# Patient Record
Sex: FEMALE | Race: WHITE | Employment: OTHER | URBAN - METROPOLITAN AREA
[De-identification: names, ages, dates, MRNs, and addresses within clinical notes are randomized per-mention and may not be internally consistent; named-entity substitution may affect disease eponyms.]

---

## 2023-07-27 ENCOUNTER — OFFICE VISIT (OUTPATIENT)
Dept: RHEUMATOLOGY | Facility: CLINIC | Age: 68
End: 2023-07-27
Payer: MEDICARE

## 2023-07-27 ENCOUNTER — TELEPHONE (OUTPATIENT)
Dept: RHEUMATOLOGY | Facility: CLINIC | Age: 68
End: 2023-07-27

## 2023-07-27 VITALS
OXYGEN SATURATION: 93 % | RESPIRATION RATE: 16 BRPM | HEIGHT: 59 IN | WEIGHT: 97 LBS | BODY MASS INDEX: 19.56 KG/M2 | TEMPERATURE: 99 F | DIASTOLIC BLOOD PRESSURE: 78 MMHG | HEART RATE: 72 BPM | SYSTOLIC BLOOD PRESSURE: 150 MMHG

## 2023-07-27 DIAGNOSIS — M81.8 OTHER OSTEOPOROSIS WITHOUT CURRENT PATHOLOGICAL FRACTURE: ICD-10-CM

## 2023-07-27 DIAGNOSIS — M81.0 AGE-RELATED OSTEOPOROSIS WITHOUT CURRENT PATHOLOGICAL FRACTURE: ICD-10-CM

## 2023-07-27 DIAGNOSIS — M19.90 OSTEOARTHRITIS, UNSPECIFIED OSTEOARTHRITIS TYPE, UNSPECIFIED SITE: ICD-10-CM

## 2023-07-27 DIAGNOSIS — M35.1 MIXED CONNECTIVE TISSUE DISEASE (HCC): Primary | ICD-10-CM

## 2023-07-27 DIAGNOSIS — M06.09 RHEUMATOID ARTHRITIS OF MULTIPLE SITES WITH NEGATIVE RHEUMATOID FACTOR (HCC): ICD-10-CM

## 2023-07-27 DIAGNOSIS — M35.01 SJOGREN'S SYNDROME WITH KERATOCONJUNCTIVITIS SICCA (HCC): ICD-10-CM

## 2023-07-27 DIAGNOSIS — M06.09 RHEUMATOID ARTHRITIS OF MULTIPLE SITES WITHOUT RHEUMATOID FACTOR (HCC): ICD-10-CM

## 2023-07-27 PROBLEM — M35.00 SICCA SYNDROME, SJOGREN'S (HCC): Status: ACTIVE | Noted: 2023-07-27

## 2023-07-27 PROCEDURE — 99215 OFFICE O/P EST HI 40 MIN: CPT | Performed by: INTERNAL MEDICINE

## 2023-07-27 RX ORDER — HYDROXYCHLOROQUINE SULFATE 200 MG/1
200 TABLET, FILM COATED ORAL DAILY
COMMUNITY
Start: 2023-03-01

## 2023-07-27 RX ORDER — AMLODIPINE BESYLATE 5 MG/1
5 TABLET ORAL
COMMUNITY
Start: 2023-03-28

## 2023-07-27 RX ORDER — LEVOTHYROXINE SODIUM 0.07 MG/1
75 TABLET ORAL
COMMUNITY
Start: 2023-07-20

## 2023-07-27 NOTE — TELEPHONE ENCOUNTER
Patient would like to proceed with prolia injections.     They plan on getting labs at [facility] and would like order sent there

## 2023-08-09 ENCOUNTER — TELEPHONE (OUTPATIENT)
Dept: RHEUMATOLOGY | Facility: CLINIC | Age: 68
End: 2023-08-09

## 2023-08-09 NOTE — TELEPHONE ENCOUNTER
Phoned pt, explained that we have not received her blood work. Pt will reach out to [employer] and fax to our office. Number provided.

## 2023-08-21 ENCOUNTER — TELEPHONE (OUTPATIENT)
Dept: RHEUMATOLOGY | Facility: CLINIC | Age: 68
End: 2023-08-21

## 2023-08-21 NOTE — TELEPHONE ENCOUNTER
Pt called office, making sure Dr. Hugo Pires received her test results from [address] [place] [address].

## 2023-09-09 ENCOUNTER — PATIENT MESSAGE (OUTPATIENT)
Dept: RHEUMATOLOGY | Facility: CLINIC | Age: 68
End: 2023-09-09

## 2023-09-12 ENCOUNTER — TELEPHONE (OUTPATIENT)
Dept: RHEUMATOLOGY | Facility: CLINIC | Age: 68
End: 2023-09-12

## 2023-09-25 RX ORDER — HYDROXYCHLOROQUINE SULFATE 200 MG/1
200 TABLET, FILM COATED ORAL DAILY
Qty: 30 TABLET | Refills: 2 | Status: SHIPPED | OUTPATIENT
Start: 2023-09-25

## 2023-09-25 NOTE — TELEPHONE ENCOUNTER
Future Appointments   Date Time Provider [place] Ivanna   1/29/2024 11:20 AM Balta Kahn MD [facility] [address]     LOV 7/27/23

## 2023-09-25 NOTE — TELEPHONE ENCOUNTER
From: Faustino Leon  To: Brad Shaver MD  Sent: 9/9/2023 7:46 PM CDT  Subject: Prolia shot    Hi Dr. Soledad Toledo,    Please let me know if my labs are alright so we can schedule the Prolia shot.     Thank you,  Lamont Dunbar

## 2023-12-27 RX ORDER — HYDROXYCHLOROQUINE SULFATE 200 MG/1
200 TABLET, FILM COATED ORAL DAILY
Qty: 30 TABLET | Refills: 2 | Status: SHIPPED | OUTPATIENT
Start: 2023-12-27

## 2023-12-27 NOTE — TELEPHONE ENCOUNTER
LOV:   07/27/23      Future Appointments   Date Time Provider [place] Goncalves   1/29/2024 11:20 AM Merissa Kahn MD [facility] [address]     LF:  09/25/2023   QTY:    30     Refills:   2    EYE EXAM:  10/20/2023

## 2024-01-29 ENCOUNTER — OFFICE VISIT (OUTPATIENT)
Dept: RHEUMATOLOGY | Facility: CLINIC | Age: 69
End: 2024-01-29
Payer: MEDICARE

## 2024-01-29 VITALS
HEIGHT: 59 IN | SYSTOLIC BLOOD PRESSURE: 140 MMHG | RESPIRATION RATE: 16 BRPM | BODY MASS INDEX: 20.36 KG/M2 | TEMPERATURE: 99 F | HEART RATE: 71 BPM | DIASTOLIC BLOOD PRESSURE: 80 MMHG | OXYGEN SATURATION: 94 % | WEIGHT: 101 LBS

## 2024-01-29 DIAGNOSIS — M81.0 AGE-RELATED OSTEOPOROSIS WITHOUT CURRENT PATHOLOGICAL FRACTURE: Primary | ICD-10-CM

## 2024-01-29 DIAGNOSIS — M35.1 MIXED CONNECTIVE TISSUE DISEASE (HCC): ICD-10-CM

## 2024-01-29 DIAGNOSIS — M62.9 DISORDER OF MUSCLE, UNSPECIFIED: ICD-10-CM

## 2024-01-29 DIAGNOSIS — M70.61 TROCHANTERIC BURSITIS OF RIGHT HIP: ICD-10-CM

## 2024-01-29 DIAGNOSIS — M06.09 RHEUMATOID ARTHRITIS OF MULTIPLE SITES WITH NEGATIVE RHEUMATOID FACTOR (HCC): ICD-10-CM

## 2024-01-29 DIAGNOSIS — M04.8 OTHER AUTOINFLAMMATORY SYNDROMES (HCC): ICD-10-CM

## 2024-01-29 DIAGNOSIS — M35.01 SJOGREN'S SYNDROME WITH KERATOCONJUNCTIVITIS SICCA (HCC): ICD-10-CM

## 2024-01-29 PROCEDURE — 99214 OFFICE O/P EST MOD 30 MIN: CPT | Performed by: INTERNAL MEDICINE

## 2024-01-29 NOTE — PATIENT INSTRUCTIONS
OSTEOARTHRITIS    Fast Facts    Though some of the joint changes are irreversible, most patients will not need joint replacement surgery.    OA symptoms (what you feel) can vary greatly among patients.    A rheumatologist can detect arthritis and prescribe the proper treatment. The goal of treatment in OA is to reduce pain and improve function.    Exercise is an important part of OA treatment, because it can decrease joint pain and improve function.    At present, there is no treatment that can reverse the damage of OA in the joints. Researchers are trying to find ways to slow or reverse this joint damage.    Osteoarthritis (also known as OA) is a common joint disease that most often affects middle-age to elderly people. It is commonly referred to as \"wear and tear\" of the joints, but we now know that OA is a disease of the entire joint, involving the cartilage, joint lining, ligaments, and bone. Although it is more common in older people, it is not really accurate to say that the joints are just \"wearing out.\" It is characterized by breakdown of the cartilage (the tissue that cushions the ends of the bones between joints), bony changes of the joints, deterioration of tendons and ligaments, and various degrees of inflammation of the joint lining (called the synovium).    This arthritis tends to occur in the hand joints, spine, hips, knees, and great toes. The lifetime risk of developing OA of the knee is about 46%, and the lifetime risk of developing OA of the hip is 25%, according to the [facility] Osteoarthritis Project, a long-term study from the [facility] and sponsored by the Centers for Disease Control and Prevention (often called the CDC) and the National Institutes of Health.    OA is a top cause of disability in older people. The goal of osteoarthritis treatment is to reduce pain and improve function. There is no cure for the disease, but some treatments attempt to slow disease  progression.         What is osteoarthritis?    OA is a frequently slowly progressive joint disease typically seen in middle-aged to elderly people. In osteoarthritis, the cartilage between the bones in the joint breaks down. This causes the affected bones to slowly get bigger. The joint cartilage often breaks down because of mechanical stress or biochemical changes within the body, causing the bone underneath to fail. OA can occur together with other types of arthritis, such as gout or rheumatoid arthritis.    OA tends to affect commonly used joints such as the hands and spine, and the weight-bearing joints such as the hips and knees. Symptoms include:    Joint pain and stiffness    Knobby swelling at the joint    Cracking or grinding noise with joint movement    Decreased function of the joint    How do you treat osteoarthritis?    There is no proven treatment yet that can reverse joint damage from OA. The goal of osteoarthritis treatment is to reduce pain and improve function of the affected joints. Most often, this is possible with a mixture of physical measures and drug therapy and, sometimes, surgery.    Physical measures: Weight loss and exercise are useful in OA. Excess weight puts stress on your knee joints and hips and low back. For every 10 pounds of weight you lose over 10 years, you can reduce the chance of developing knee OA by up to 50 percent. Exercise can improve your muscle strength, decrease joint pain and stiffness, and lower the chance of disability due to OA. Also helpful are support (\"assistive\") devices, such as orthotics or a walking cane, that help you do daily activities. Heat or cold therapy can help relieve OA symptoms for a short time.    Certain alternative treatments such as spa (hot tub), massage, and chiropractic manipulation can help relieve pain for a short time. They can be costly, though, and require repeated treatments. Also, the long-term benefits of these alternative  (sometimes called complementary or integrative) medicine treatments are unproven but are under study.    Drug therapy: Forms of drug therapy include topical, oral (by mouth) and injections (shots). You apply topical drugs directly on the skin over the affected joints. These medicines include capsaicin cream, lidocaine and diclofenac gel. Oral pain relievers such as acetaminophen are common first treatments. So are nonsteroidal anti-inflammatory drugs (often called NSAIDs), which decrease swelling and pain.    In 2010, the government (FDA) approved the use of duloxetine (Cymbalta) for chronic (long-term) musculoskeletal pain including from OA. This oral drug is not new. It also is in use for other health concerns, such as mood disorders, nerve pain and fibromyalgia.    Patients with more serious pain may need stronger medications, such as prescription narcotics.    Joint injections with corticosteroids (sometimes called cortisone shots) or with a form of lubricant called hyaluronic acid can give months of pain relief from OA. This lubricant is given in the knee, and these shots may help delay the need for a knee replacement by a few years in some patients.    Surgery: Surgical treatment becomes an option for severe cases. This includes when the joint has serious damage, or when medical treatment fails to relieve pain and you have major loss of function. Surgery may involve arthroscopy, repair of the joint done through small incisions (cuts). If the joint damage cannot be repaired, you may need a joint replacement.    Supplements: Many over-the-counter nutrition supplements have been used for osteoarthritis treatment. Most lack good research data to support their effectiveness and safety. Among the most widely used are calcium, vitamin D and omega-3 fatty acids. To ensure safety and avoid drug interactions, consult your doctor or pharmacist before using any of these supplements. This is especially true when you are  combining these supplements with prescribed

## 2024-01-29 NOTE — PROGRESS NOTES
[facility], [address] [place] [address]      Consult     Fabiola Walker Patient Status:  No patient class for patient encounter    12/10/1955 MRN [id number]   Location [facility], [address] [place] [address] Attending No att. providers found   Hosp Day # 0 PCP EMILIANO BOOTHE     Referring Provider:     Reason for Consultation:     Subjective:    Fabiola Walker is a 68 year old female with  woman comes in for further evaluation for 1.5-2 month history of initial response swelling and then hand swelling with increased shoulder pain and stiffness and occasional ankle foot pain    Patient states no infections prior to the onset of symptoms    States no trauma or fevers chills. States family history of gout with the patient's mother seeing me in the past but no clear history of rheumatoid arthritis. She quit smoking about 5 years ago and has known COPD on treatment and followed by pulmonology with inhalers    She states no shortness of breath or chest pain. States no urinary or bowel symptoms.    States prior to 2 months ago she was very active and had no physical limitations. She has occasional aches and pains in her hips and knees but not too problematic. No swelling of her ankles or feet or occasional aches and pains and has had a history of plantar fasciitis in the past    She came in for initial evaluation 2023 for hand pain and swelling  She had inflammatory polyarthritis and joints  Patient had an obvious inflammatory arthritis and further testing revealed positive Kern/RNP and SSA antibody  She was started on Medrol Dosepak and x-rays were unrevealing other than mild arthritis     We had brought her into discuss the results of this testing and most likely diagnosis of early mixed connective tissue disease with more manifestations of inflammatory arthritis likely rheumatoid arthritis despite negative serological testing or inflammatory arthritis related to mixed  connective tissue disease. She has minimal sicca symptoms     She is also on hydralazine which we have discussed considering switching to another agent because of positive antihistone antibodies to avoid possibility of drug-induced lupus as well    She has been off of that as well     She had responded to Medrol Dosepak and started hydroxychloroquine 200 mg daily without any side effects. She states she did get an eye exam which we'll need to obtain as a baseline she is now down to prednisone 2.5 mg daily    She was last seen in July 2023    She continued hydroxychloroquine 200 mg daily and weaned off prednisone completely without any worsening symptoms    She did get an eye exam October 2023 that was negative for toxicity    Bone density also revealed osteoporosis T score -3 persistent after being on Fosamax for at least 5 years Through her PCP.  She was interested in switching to Prolia injections which we have discussed again today.  She never ended up getting it last visit because of transition.  She would like us to send orders to [facility].  She did get her last Prolia injection about 6 months ago without any side effects or problems    She is trying to take her calcium vitamin D regularly.    She has no concerns at this time.     Potential risk side effects discussed. She is getting CMP and labs at [facility] 2 weeks before next injection.    She would like to remain on treatment for now she is doing very well her shoulder pain also resolved and she has no manifestations of swelling at this time        History/Other:        Past Medical History:  Past Medical History:   Diagnosis Date    COPD (chronic obstructive pulmonary disease) (HCC)     High blood pressure         Past Surgical History:   Past Surgical History:   Procedure Laterality Date    COLOSTOMY      HYSTERECTOMY      TONSILLECTOMY         Social History:  reports that she has quit smoking. She has never used smokeless tobacco. She  reports that she does not drink alcohol.    Family History:   Family History   Problem Relation Age of Onset    Hypertension Mother     Hypertension Maternal Grandmother     Hypertension Sister        Allergies:   Allergies   Allergen Reactions    Caffeine UNKNOWN    Codeine UNKNOWN       Current Medications:      (Not in a hospital admission)      Review of Systems:     Constitutional: Negative for chills, , fatigue, fever and unexpected weight change.    HENT: Negative for congestion, and mouth sores.    Eyes: Negative for photophobia, pain, redness and visual disturbance.    Respiratory: Negative for apnea, cough, chest tightness, shortness of breath, wheezing and stridor.    Cardiovascular: Negative for chest pain, palpitations and leg swelling.    Gastrointestinal: Negative for abdominal distention, abdominal pain, blood in stool, constipation, diarrhea and nausea.    Endocrine: Negative.     Genitourinary: Negative for decreased urine volume, difficulty urinating, dyspareunia, dysuria, flank pain, and frequency.    Musculoskeletal: Negative for arthralgias, gait problem and joint swelling.    Skin: Negative for color change, pallor and rash. No raynauds or digital ulcerations no sclerodactly.    Allergic/Immunologic: Negative.    Neurological: Negative for dizziness, tremors, seizures, syncope, speech difficulty, weakness, light-headedness, numbness and headaches.    Hematological: Does not bruise/bleed easily.    Psychiatric/Behavioral: Negative for confusion, decreased concentration, hallucinations, self-injury, sleep disturbance and suicidal ideas or depression.    Objective:   [unfilled]  /80   Pulse 71   Temp 98.5 °F (36.9 °C)   Resp 16   Ht 4' 11\" (1.499 m)   Wt 101 lb (45.8 kg)   SpO2 94%   BMI 20.40 kg/m²       Constitutional: is oriented to person, place, and time. Appears well-developed and well-nourished. No distress.    HEENT: Normocephalic; EOMI; no jvd; no LAD; no oral or  nasal ulcers.     Eyes: Conjunctivae and EOM are normal. Pupils are equal, round, and reactive to light.     Neck: Normal range of motion. No thyromegaly present.    Cardiovascular: RRR, no murmurs.    Lungs: Clear, Bilateral air entry, no wheezes.    Abdominal: Soft.    Musculoskeletal:        Right shoulder: Exhibits normal range of motion on abduction and internal rotation, no tenderness, no bony tenderness, no deformity, no laceration, no pain and no spasm.        Left shoulder: Exhibits normal range of motion on abduction and internal and external rotation.  no tenderness, no bony tenderness, no swelling, no effusion, no deformity, no pain, no spasm and normal strength.        Right elbow:  Exhibits normal range of motion, no swelling, no effusion and no deformity. No tenderness found. No medial epicondyle, no lateral epicondyle and no olecranon process tenderness noted. There are no contractures or tophi or nodules.        Left elbow:  Normal range of motion, no swelling, no effusion and no deformity. No medial epicondyle, no lateral epicondyle and no olecranon process tenderness noted. There are no contractures or tophi or nodules.        Right wrist:  Exhibits normal range of motion, no tenderness, no bony tenderness, no swelling, no effusion and no crepitus. Flexion and extension intact w/o limitation.        Left wrist: Exhibits normal range of motion, no tenderness, no bony tenderness, no swelling, no effusion, no crepitus and no deformity. Flexion and extension intact without limitation.        Right hip: Exhibits normal range of motion, normal strength, no tenderness, no bony tenderness, no swelling and no crepitus.        Right hand: No synovitis of MCP,PIP or DIP joints; there are scattered Bouchards and Heberden nodules noted;  strength: 100%.  Moderate squaring first CMC joint        Left hand: No synovitis of MCP,PIP or DIP joints; there are scattered Bouchards and Heberden nodules noted;   strength: 100%.  Moderate squaring first CMC joint        Left hip: Exhibits normal range of motion, normal strength, no tenderness, no bony tenderness, No swelling and no crepitus.        Right knee: Exhibits normal range of motion, no swelling, no effusion, no ecchymosis, no deformity and no erythema. No tenderness found. No medial joint line, no lateral joint line, no MCL and no LCL tenderness noted. mod crepitation on flexion of knee and extension normal.        Left knee:  Exhibits normal range of motion, no swelling, no effusion, no ecchymosis and no erythema. No tenderness found. No medial joint line, no lateral joint line and no patellar tendon tenderness noted. mod crepitation on flexion of the knee. Extension intact and normal.        Right ankle: No swelling, no deformity. No tenderness. Dorsiflexion and plantar flexion intact without limitation in range of motion.        Left ankle: Exhibits no swelling. No tenderness. No lateral malleolus and no medial malleolus tenderness found. Achilles tendon normal. Achilles tendon exhibits no pain, no defect and normal Garcia's test results.  Dorsiflexion and plantar flexion intact without limitation in range of motion.        Cervical back: Exhibits normal range of motion, no tenderness, no bony tenderness, no swelling, no pain and no spasm.        Thoracic back: Exhibits normal range of motion, no tenderness, no bony tenderness and no spasm.        Lumbar back:  Exhibits normal range of motion, no tenderness, no bony tenderness, no pain and no spasm.        Right foot: normal. There is normal range of motion, no tenderness, no bony tenderness, no crepitus and no laceration. There is no synovitis or tenderness of the MTP joints to palpation.  Bony enlargement of the first MTP joint        Left foot: normal. There is normal range of motion, no tenderness, no bony tenderness and no crepitus. There is no synovitis or tenderness of the MTP joints to palpation.  On  enlargement of the first MTP joint    Lymphadenopathy: No submental, no submandibular, and no occipital adenopathy present, has no cervical adenopathy or axillary lympadenopathy.    Neurological: Alert and oriented. No focal motor or sensory abnormalities. Strength is 5/5 Upper Extremities/Lower Extremities proximally and distally.    Skin: Skin is warm, dry and intact.  No rashes no purpura petechial lesions    Psychiatric: Normal behavior.    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  Last Prolia injection was sometime August 2023.  She states she is due sometime in March 2024.  We have given orders to check CMP magnesium levels 1 to 2 weeks before next injection.  Previous to that she was on Fosamax for 5 years through PCP.  Bone density showing persistent osteoporosis    Bone density also revealed osteoporosis T score -3 persistent; will have her sign a release to get the last DEXA scan on file    Fall precautions discussed.  Continue calcium vitamin D.  At least 1200 mg calcium vitamin D at least 3 to 4000 IU D3.    She has no complaints of pain.      Return to clinic in 6 months    Education and counseling provided to patient. Instructed patient to call my office or seek medical attention immediately if symptoms worsen. Risks and side  effects of medications and diagnosis discussed in detail and patient was given written information on new prescribed medications.        Education and counseling provided to patient.  Instructed patient to call my office or seek medical attention immediately if symptoms worsen. Risks and side effects of medications and diagnosis discussed in detail and patient was given written information on new prescribed medications.    Return to clinic:  Return in about 6 months (around 7/29/2024).    Sienna Kahn MD  7/27/2023

## 2024-03-15 ENCOUNTER — PATIENT MESSAGE (OUTPATIENT)
Dept: RHEUMATOLOGY | Facility: CLINIC | Age: 69
End: 2024-03-15

## 2024-03-15 DIAGNOSIS — M35.01 SJOGREN'S SYNDROME WITH KERATOCONJUNCTIVITIS SICCA (HCC): Primary | ICD-10-CM

## 2024-03-15 DIAGNOSIS — M35.1 MIXED CONNECTIVE TISSUE DISEASE (HCC): ICD-10-CM

## 2024-03-15 DIAGNOSIS — M06.09 RHEUMATOID ARTHRITIS OF MULTIPLE SITES WITH NEGATIVE RHEUMATOID FACTOR (HCC): ICD-10-CM

## 2024-03-15 DIAGNOSIS — M04.8 OTHER AUTOINFLAMMATORY SYNDROMES (HCC): ICD-10-CM

## 2024-03-21 DIAGNOSIS — M35.01 SJOGREN'S SYNDROME WITH KERATOCONJUNCTIVITIS SICCA (HCC): ICD-10-CM

## 2024-03-21 DIAGNOSIS — M06.09 RHEUMATOID ARTHRITIS OF MULTIPLE SITES WITHOUT RHEUMATOID FACTOR (HCC): Primary | ICD-10-CM

## 2024-03-22 RX ORDER — HYDROXYCHLOROQUINE SULFATE 200 MG/1
200 TABLET, FILM COATED ORAL DAILY
Qty: 30 TABLET | Refills: 2 | Status: SHIPPED | OUTPATIENT
Start: 2024-03-22

## 2024-03-22 NOTE — TELEPHONE ENCOUNTER
Future Appointments   Date Time Provider Department Center   8/5/2024 10:40 AM Sienna Kahn MD [facility] [address]     Last office visit: 1/29/2024    Last fill: 12/27/2023 30 tab, 2 refills    Last eye exam: 10/2023    Added to 8/29/2023 scanning encounter

## 2024-03-22 NOTE — TELEPHONE ENCOUNTER
LOV: 1/29/24  Future Appointments   Date Time Provider Department Center   8/5/2024 10:40 AM Sienna Kahn MD [facility] [address]   LABS:outstanding

## 2024-03-22 NOTE — TELEPHONE ENCOUNTER
Neri Rosado, SINDI 3/22/2024 8:46 AM CDT      ----- Message -----  From: Fabiola Walker  Sent: 3/21/2024 6:05 PM CDT  To: [facility] Clinical Staff  Subject: Prolia shot     Espinoza Mena, I did go to [facility] today March 21, 2024. Hopefully you will get my results back so I can make the appointment to get my second prolia shot. You need to contact them and they will set up my appointment after I call them back. Also, I will be needing my HYROXYCHLOROQUINE 200 mg tablets filled. thank you

## 2024-04-10 ENCOUNTER — PATIENT MESSAGE (OUTPATIENT)
Dept: RHEUMATOLOGY | Facility: CLINIC | Age: 69
End: 2024-04-10

## 2024-04-24 NOTE — TELEPHONE ENCOUNTER
Signed order for PROLIA faxed over to [facility] for patient to complete. Signed order and fax confirmation sent to scanning.

## 2024-04-29 ENCOUNTER — TELEPHONE (OUTPATIENT)
Dept: RHEUMATOLOGY | Facility: CLINIC | Age: 69
End: 2024-04-29

## 2024-04-29 NOTE — TELEPHONE ENCOUNTER
Called pt, left detailed message. [facility] is no longer giving injections of Prolia. Orders for Prolia faxed to [facility]. Number left for pt to call to schedule or call our office with concerns.

## 2024-06-13 DIAGNOSIS — M35.01 SJOGREN'S SYNDROME WITH KERATOCONJUNCTIVITIS SICCA (HCC): ICD-10-CM

## 2024-06-13 DIAGNOSIS — M06.09 RHEUMATOID ARTHRITIS OF MULTIPLE SITES WITH NEGATIVE RHEUMATOID FACTOR (HCC): ICD-10-CM

## 2024-06-13 DIAGNOSIS — M35.1 MIXED CONNECTIVE TISSUE DISEASE (HCC): ICD-10-CM

## 2024-06-13 DIAGNOSIS — M06.09 RHEUMATOID ARTHRITIS OF MULTIPLE SITES WITHOUT RHEUMATOID FACTOR (HCC): ICD-10-CM

## 2024-06-13 DIAGNOSIS — M04.8 OTHER AUTOINFLAMMATORY SYNDROMES (HCC): ICD-10-CM

## 2024-06-13 NOTE — TELEPHONE ENCOUNTER
LOV:  01/29/2024    Future Appointments   Date Time Provider Department Center   8/5/2024 10:40 AM Sienna Kahn MD [facility] [address]       LF: 03/22/2024    QTY:   30    Refills:   2    EYE EXAM:  10/20/2023

## 2024-06-13 NOTE — TELEPHONE ENCOUNTER
This is a duplicate request. I just sent the first request to Dr now. Please refuse this prescription

## 2024-06-17 RX ORDER — HYDROXYCHLOROQUINE SULFATE 200 MG/1
200 TABLET, FILM COATED ORAL DAILY
Qty: 90 TABLET | Refills: 1 | Status: SHIPPED | OUTPATIENT
Start: 2024-06-17

## 2024-06-18 RX ORDER — HYDROXYCHLOROQUINE SULFATE 200 MG/1
200 TABLET, FILM COATED ORAL DAILY
Qty: 30 TABLET | Refills: 2 | OUTPATIENT
Start: 2024-06-18

## 2024-08-05 ENCOUNTER — OFFICE VISIT (OUTPATIENT)
Dept: RHEUMATOLOGY | Facility: CLINIC | Age: 69
End: 2024-08-05
Payer: MEDICARE

## 2024-08-05 VITALS
HEART RATE: 72 BPM | TEMPERATURE: 98 F | BODY MASS INDEX: 19.92 KG/M2 | SYSTOLIC BLOOD PRESSURE: 150 MMHG | WEIGHT: 98.81 LBS | RESPIRATION RATE: 16 BRPM | HEIGHT: 59 IN | DIASTOLIC BLOOD PRESSURE: 80 MMHG | OXYGEN SATURATION: 94 %

## 2024-08-05 DIAGNOSIS — M06.09 RHEUMATOID ARTHRITIS OF MULTIPLE SITES WITH NEGATIVE RHEUMATOID FACTOR (HCC): ICD-10-CM

## 2024-08-05 DIAGNOSIS — M81.0 AGE-RELATED OSTEOPOROSIS WITHOUT CURRENT PATHOLOGICAL FRACTURE: ICD-10-CM

## 2024-08-05 DIAGNOSIS — Z79.899 ENCOUNTER FOR DRUG THERAPY: ICD-10-CM

## 2024-08-05 DIAGNOSIS — M35.00 SJOGREN'S SYNDROME, WITH UNSPECIFIED ORGAN INVOLVEMENT (HCC): ICD-10-CM

## 2024-08-05 DIAGNOSIS — M35.1 MIXED CONNECTIVE TISSUE DISEASE (HCC): Primary | ICD-10-CM

## 2024-08-05 PROCEDURE — 99214 OFFICE O/P EST MOD 30 MIN: CPT | Performed by: INTERNAL MEDICINE

## 2024-08-05 RX ORDER — FLUTICASONE FUROATE, UMECLIDINIUM BROMIDE AND VILANTEROL TRIFENATATE 100; 62.5; 25 UG/1; UG/1; UG/1
1 POWDER RESPIRATORY (INHALATION) DAILY
COMMUNITY
Start: 2024-07-23

## 2024-08-05 RX ORDER — POLYETHYLENE GLYCOL 3350 17 G/17G
17 POWDER, FOR SOLUTION ORAL DAILY
COMMUNITY

## 2024-08-05 NOTE — PROGRESS NOTES
[facility], [address] [place] [address]      Consult     Fabiola Walker Patient Status:  No patient class for patient encounter    12/10/1955 MRN [id number]   Location [facility], [address] [place] [address] Attending No att. providers found   Hosp Day # 0 PCP EMILIANO BOOTHE     Referring Provider: PCP    Reason for Consultation: Mixed connective tissue disease    Subjective:    Fabiola Walker is a 68 year old female with  woman comes in for further evaluation for 1.5-2 month history of initial response swelling and then hand swelling with increased shoulder pain and stiffness and occasional ankle foot pain    Patient states no infections prior to the onset of symptoms    States no trauma or fevers chills. States family history of gout with the patient's mother seeing me in the past but no clear history of rheumatoid arthritis. She quit smoking about 5 years ago and has known COPD on treatment and followed by pulmonology with inhalers    She states no shortness of breath or chest pain. States no urinary or bowel symptoms.    States prior to 2 months ago she was very active and had no physical limitations. She has occasional aches and pains in her hips and knees but not too problematic. No swelling of her ankles or feet or occasional aches and pains and has had a history of plantar fasciitis in the past    She came in for initial evaluation 2023 for hand pain and swelling  She had inflammatory polyarthritis and joints  Patient had an obvious inflammatory arthritis and further testing revealed positive Kern/RNP and SSA antibody  She was started on Medrol Dosepak and x-rays were unrevealing other than mild arthritis     We had brought her into discuss the results of this testing and most likely diagnosis of early mixed connective tissue disease with more manifestations of inflammatory arthritis likely rheumatoid arthritis despite negative serological testing or  inflammatory arthritis related to mixed connective tissue disease. She has minimal sicca symptoms     She is also on hydralazine which we have discussed considering switching to another agent because of positive antihistone antibodies to avoid possibility of drug-induced lupus as well    She has been off of that as well     She had responded to Medrol Dosepak and started hydroxychloroquine 200 mg daily without any side effects. She states she did get an eye exam which we'll need to obtain as a baseline she is now down to prednisone 2.5 mg daily    She was last seen in January 2024    She continued hydroxychloroquine 200 mg daily and weaned off prednisone completely without any worsening symptoms    She did get an eye exam October 2023 that was negative for toxicity    Bone density also revealed osteoporosis T score -3 persistent after being on Fosamax for at least 5 years Through her PCP.  We never received the DEXA scan that was done at [facility] which we will request again today    She did switch to Prolia injections and has had 2 injections with the last month 1 March 2024 with no side effects    She is trying to take her calcium vitamin D regularly.    She has no concerns at this time.    Potential risk side effects discussed. She is getting CMP and labs at [facility] 2 weeks before next injection.    She would like to remain on treatment for now she is doing very well her shoulder pain also resolved and she has no manifestations of swelling at this time    She had occasional lower extremity edema that comes and goes but not regularly.  She has nothing today that is concerning she does have known varicose veins    History/Other:        Past Medical History:  Past Medical History:    COPD (chronic obstructive pulmonary disease) (HCC)    High blood pressure        Past Surgical History:   Past Surgical History:   Procedure Laterality Date    Colostomy      Hysterectomy      Tonsillectomy          Social History:  reports that she has quit smoking. She has never used smokeless tobacco. She reports that she does not drink alcohol.    Family History:   Family History   Problem Relation Age of Onset    Hypertension Mother     Hypertension Maternal Grandmother     Hypertension Sister        Allergies:   Allergies   Allergen Reactions    Caffeine UNKNOWN    Codeine UNKNOWN       Current Medications:  No current facility-administered medications for this visit.       (Not in a hospital admission)      Review of Systems:     Constitutional: Negative for chills, , fatigue, fever and unexpected weight change.    HENT: Negative for congestion, and mouth sores.    Eyes: Negative for photophobia, pain, redness and visual disturbance.    Respiratory: Negative for apnea, cough, chest tightness, shortness of breath, wheezing and stridor.    Cardiovascular: Negative for chest pain, palpitations and leg swelling.    Gastrointestinal: Negative for abdominal distention, abdominal pain, blood in stool, constipation, diarrhea and nausea.    Endocrine: Negative.     Genitourinary: Negative for decreased urine volume, difficulty urinating, dyspareunia, dysuria, flank pain, and frequency.    Musculoskeletal: Negative for arthralgias, gait problem and joint swelling.    Skin: Negative for color change, pallor and rash. No raynauds or digital ulcerations no sclerodactly.    Allergic/Immunologic: Negative.    Neurological: Negative for dizziness, tremors, seizures, syncope, speech difficulty, weakness, light-headedness, numbness and headaches.    Hematological: Does not bruise/bleed easily.    Psychiatric/Behavioral: Negative for confusion, decreased concentration, hallucinations, self-injury, sleep disturbance and suicidal ideas or depression.    Objective:   [unfilled]  /80   Pulse 72   Temp 98.3 °F (36.8 °C)   Resp 16   Ht 4' 11\" (1.499 m)   Wt 98 lb 12.8 oz (44.8 kg)   SpO2 94%   BMI 19.96 kg/m²        Constitutional: is oriented to person, place, and time. Appears well-developed and well-nourished. No distress.    HEENT: Normocephalic; EOMI; no jvd; no LAD; no oral or nasal ulcers.     Eyes: Conjunctivae and EOM are normal. Pupils are equal, round, and reactive to light.     Neck: Normal range of motion. No thyromegaly present.    Cardiovascular: RRR, no murmurs.    Lungs: Clear, Bilateral air entry, no wheezes.    Abdominal: Soft.    Musculoskeletal:        Right shoulder: Exhibits normal range of motion on abduction and internal rotation, no tenderness, no bony tenderness, no deformity, no laceration, no pain and no spasm.        Left shoulder: Exhibits normal range of motion on abduction and internal and external rotation.  no tenderness, no bony tenderness, no swelling, no effusion, no deformity, no pain, no spasm and normal strength.        Right elbow:  Exhibits normal range of motion, no swelling, no effusion and no deformity. No tenderness found. No medial epicondyle, no lateral epicondyle and no olecranon process tenderness noted. There are no contractures or tophi or nodules.        Left elbow:  Normal range of motion, no swelling, no effusion and no deformity. No medial epicondyle, no lateral epicondyle and no olecranon process tenderness noted. There are no contractures or tophi or nodules.        Right wrist:  Exhibits normal range of motion, no tenderness, no bony tenderness, no swelling, no effusion and no crepitus. Flexion and extension intact w/o limitation.        Left wrist: Exhibits normal range of motion, no tenderness, no bony tenderness, no swelling, no effusion, no crepitus and no deformity. Flexion and extension intact without limitation.        Right hip: Exhibits normal range of motion, normal strength, no tenderness, no bony tenderness, no swelling and no crepitus.        Right hand: No synovitis of MCP,PIP or DIP joints; there are scattered Bouchards and Heberden nodules noted;   strength: 100%.  Moderate squaring first CMC joint        Left hand: No synovitis of MCP,PIP or DIP joints; there are scattered Bouchards and Heberden nodules noted;  strength: 100%.  Moderate squaring first CMC joint        Left hip: Exhibits normal range of motion, normal strength, no tenderness, no bony tenderness, No swelling and no crepitus.        Right knee: Exhibits normal range of motion, no swelling, no effusion, no ecchymosis, no deformity and no erythema. No tenderness found. No medial joint line, no lateral joint line, no MCL and no LCL tenderness noted. mod crepitation on flexion of knee and extension normal.        Left knee:  Exhibits normal range of motion, no swelling, no effusion, no ecchymosis and no erythema. No tenderness found. No medial joint line, no lateral joint line and no patellar tendon tenderness noted. mod crepitation on flexion of the knee. Extension intact and normal.        Right ankle: No swelling, no deformity. No tenderness. Dorsiflexion and plantar flexion intact without limitation in range of motion.        Left ankle: Exhibits no swelling. No tenderness. No lateral malleolus and no medial malleolus tenderness found. Achilles tendon normal. Achilles tendon exhibits no pain, no defect and normal Garcia's test results.  Dorsiflexion and plantar flexion intact without limitation in range of motion.        Cervical back: Exhibits normal range of motion, no tenderness, no bony tenderness, no swelling, no pain and no spasm.        Thoracic back: Exhibits normal range of motion, no tenderness, no bony tenderness and no spasm.        Lumbar back:  Exhibits normal range of motion, no tenderness, no bony tenderness, no pain and no spasm.        Right foot: normal. There is normal range of motion, no tenderness, no bony tenderness, no crepitus and no laceration. There is no synovitis or tenderness of the MTP joints to palpation.  Bony enlargement of the first MTP joint         Left foot: normal. There is normal range of motion, no tenderness, no bony tenderness and no crepitus. There is no synovitis or tenderness of the MTP joints to palpation.  On enlargement of the first MTP joint    Lymphadenopathy: No submental, no submandibular, and no occipital adenopathy present, has no cervical adenopathy or axillary lympadenopathy.    Neurological: Alert and oriented. No focal motor or sensory abnormalities. Strength is 5/5 Upper Extremities/Lower Extremities proximally and distally.    Skin: Skin is warm, dry and intact.  No rashes no purpura petechial lesions    Psychiatric: Normal behavior.    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  Last Prolia injection was sometime March 2024 . We have given orders to check CMP magnesium levels 1 to 2 weeks before next injection is due November 2024.  Previous to that she was on Fosamax for 5 years through PCP.  Bone density showing persistent osteoporosis    Would like to obtain the last DEXA scan which was not sent yet    Last bone density also revealed osteoporosis T score -3 persistent; will have her sign a release to get the last DEXA scan on file    Fall precautions discussed.  Continue calcium vitamin D.  At least 1200 mg calcium vitamin D at least 3 to 4000 IU D3.    She has no  complaints of pain.    I suspect some venous insufficiency with known varicose veins that is causing some of her occasional lower extremity edema.  She has no swelling today    If this becomes a problem suggested she see her PCP to consider arterial and venous studies and echocardiogram    Given her order CMP magnesium vitamin D levels to be done 1 to 2 weeks before next Prolia injection    Return to clinic in 6 months    Education and counseling provided to patient. Instructed patient to call my office or seek medical attention immediately if symptoms worsen. Risks and side effects of medications and diagnosis discussed in detail and patient was given written information on new prescribed medications.    Education and counseling provided to patient.  Instructed patient to call my office or seek medical attention immediately if symptoms worsen. Risks and side effects of medications and diagnosis discussed in detail and patient was given written information on new prescribed medications.    Return to clinic:  Return in about 6 months (around 2/5/2025).    Sienna Kahn MD  7/27/2023

## 2024-11-07 ENCOUNTER — TELEPHONE (OUTPATIENT)
Dept: RHEUMATOLOGY | Facility: CLINIC | Age: 69
End: 2024-11-07

## 2024-11-08 NOTE — TELEPHONE ENCOUNTER
Spoke with pt. and she requests [facility]     [facility]ed labs (CMP, Vitamin D, Mag) [facility]  [facility] [phone]

## 2024-12-04 ENCOUNTER — PATIENT MESSAGE (OUTPATIENT)
Dept: RHEUMATOLOGY | Facility: CLINIC | Age: 69
End: 2024-12-04

## 2024-12-05 NOTE — TELEPHONE ENCOUNTER
Nelly this patient needs her Prolia order faxed over to [facility] so she can complete it. Dr BOBO has her lab work in her basket and she has already signed off on it. Thanks!!!

## 2024-12-05 NOTE — TELEPHONE ENCOUNTER
Denies known Latex allergy or symptoms of Latex sensitivity.  Medications reviewed and updated.  Tobacco history reviewed.   LMP reviewed.  PCP verified.    Patient would like communication of their results via:        Cell Phone:   Telephone Information:   Mobile [phone]     Okay to leave a message containing results? Yes     Prolia order sent over to [facility] for patient to complete. Order and fax confirmation sent to scanning.

## 2024-12-16 DIAGNOSIS — M06.09 RHEUMATOID ARTHRITIS OF MULTIPLE SITES WITH NEGATIVE RHEUMATOID FACTOR (HCC): ICD-10-CM

## 2024-12-16 DIAGNOSIS — M04.8 OTHER AUTOINFLAMMATORY SYNDROMES (HCC): ICD-10-CM

## 2024-12-16 DIAGNOSIS — M35.1 MIXED CONNECTIVE TISSUE DISEASE (HCC): ICD-10-CM

## 2024-12-16 DIAGNOSIS — M35.01 SJOGREN'S SYNDROME WITH KERATOCONJUNCTIVITIS SICCA (HCC): ICD-10-CM

## 2024-12-17 RX ORDER — HYDROXYCHLOROQUINE SULFATE 200 MG/1
200 TABLET, FILM COATED ORAL DAILY
Qty: 30 TABLET | Refills: 0 | Status: SHIPPED | OUTPATIENT
Start: 2024-12-17

## 2024-12-17 NOTE — TELEPHONE ENCOUNTER
LOV: 08/05/2024    Future Appointments   Date Time Provider Department Center   2/3/2025 10:40 AM Sienna Kahn MD [id number] [address]       LF: 03/22/2024     QTY: 30     Refills: 2    EYE EXAM: Patient is calling her [facility] and having them fax over the report

## 2025-02-03 ENCOUNTER — OFFICE VISIT (OUTPATIENT)
Dept: RHEUMATOLOGY | Facility: CLINIC | Age: 70
End: 2025-02-03
Payer: MEDICARE

## 2025-02-03 VITALS
SYSTOLIC BLOOD PRESSURE: 138 MMHG | HEART RATE: 78 BPM | HEIGHT: 59 IN | OXYGEN SATURATION: 95 % | BODY MASS INDEX: 20.16 KG/M2 | RESPIRATION RATE: 16 BRPM | DIASTOLIC BLOOD PRESSURE: 80 MMHG | WEIGHT: 100 LBS | TEMPERATURE: 98 F

## 2025-02-03 DIAGNOSIS — M81.0 AGE-RELATED OSTEOPOROSIS WITHOUT CURRENT PATHOLOGICAL FRACTURE: ICD-10-CM

## 2025-02-03 DIAGNOSIS — M35.00 SJOGREN'S SYNDROME, WITH UNSPECIFIED ORGAN INVOLVEMENT (HCC): ICD-10-CM

## 2025-02-03 DIAGNOSIS — Z79.899 ENCOUNTER FOR DRUG THERAPY: ICD-10-CM

## 2025-02-03 DIAGNOSIS — M06.09 RHEUMATOID ARTHRITIS OF MULTIPLE SITES WITH NEGATIVE RHEUMATOID FACTOR (HCC): ICD-10-CM

## 2025-02-03 DIAGNOSIS — M35.1 MIXED CONNECTIVE TISSUE DISEASE (HCC): Primary | ICD-10-CM

## 2025-02-03 PROCEDURE — 99214 OFFICE O/P EST MOD 30 MIN: CPT | Performed by: INTERNAL MEDICINE

## 2025-02-03 RX ORDER — ALBUTEROL SULFATE 90 UG/1
2 INHALANT RESPIRATORY (INHALATION) 4 TIMES DAILY PRN
COMMUNITY
Start: 2025-01-30

## 2025-02-03 NOTE — PROGRESS NOTES
[facility], [address] [place] [address]      Consult     Fabiola Walker Patient Status:  No patient class for patient encounter    12/10/1955 MRN [id number]   Location [facility], [address] [place] [address] Attending No att. providers found   Hosp Day # 0 PCP EMILIANO BOOTHE     Referring Provider: PCP    Reason for Consultation: Mixed connective tissue disease    Subjective:    Fabiola Walker is a 69 year old female with  woman comes in for further evaluation for 1.5-2 month history of initial response swelling and then hand swelling with increased shoulder pain and stiffness and occasional ankle foot pain    Patient states no infections prior to the onset of symptoms    States no trauma or fevers chills. States family history of gout with the patient's mother seeing me in the past but no clear history of rheumatoid arthritis. She quit smoking about 5 years ago and has known COPD on treatment and followed by pulmonology with inhalers    She states no shortness of breath or chest pain. States no urinary or bowel symptoms.    States prior to 2 months ago she was very active and had no physical limitations. She has occasional aches and pains in her hips and knees but not too problematic. No swelling of her ankles or feet or occasional aches and pains and has had a history of plantar fasciitis in the past    She came in for initial evaluation 2023 for hand pain and swelling  She had inflammatory polyarthritis and joints  Patient had an obvious inflammatory arthritis and further testing revealed positive Kern/RNP and SSA antibody  She was started on Medrol Dosepak and x-rays were unrevealing other than mild arthritis     We had brought her into discuss the results of this testing and most likely diagnosis of early mixed connective tissue disease with more manifestations of inflammatory arthritis likely rheumatoid arthritis despite negative serological testing or  inflammatory arthritis related to mixed connective tissue disease. She has minimal sicca symptoms     She is also on hydralazine which we have discussed considering switching to another agent because of positive antihistone antibodies to avoid possibility of drug-induced lupus as well    She has been off of that as well     She had responded to Medrol Dosepak and started hydroxychloroquine 200 mg daily without any side effects. She states she did get an eye exam which we'll need to obtain as a baseline she is now down to prednisone 2.5 mg daily    She was last seen in August 2024    She continued hydroxychloroquine 200 mg daily and weaned off prednisone completely without any worsening symptoms    She did get an eye exam December 2024 negative for toxicity    Bone density also revealed osteoporosis T score -3 persistent after being on Fosamax for at least 5 years Through her PCP.  We never received the DEXA scan that was done at [facility] which we will request again today.  She did get her Prolia injection January 2025 and she is due again July 2025    Open to changing appointment to 5 months to repeat labs and get new DEXA scan order since she will be her fourth injection of Prolia    She is trying to take her calcium vitamin D regularly.    She has no concerns at this time.    Potential risk side effects discussed. She is getting CMP and labs at [facility] 2 weeks before next injection.    She would like to remain on treatment for now she is doing very well her shoulder pain also resolved and she has no manifestations of swelling at this time    She had occasional lower extremity edema that comes and goes but not regularly.  She has nothing today that is concerning she does have known varicose veins    History/Other:        Past Medical History:  Past Medical History:    COPD (chronic obstructive pulmonary disease) (HCC)    High blood pressure        Past Surgical History:   Past Surgical History:    Procedure Laterality Date    Colostomy      Hysterectomy      Tonsillectomy         Social History:  reports that she has quit smoking. She has never used smokeless tobacco. She reports that she does not drink alcohol.    Family History:   Family History   Problem Relation Age of Onset    Hypertension Mother     Hypertension Maternal Grandmother     Hypertension Sister        Allergies:   Allergies   Allergen Reactions    Caffeine UNKNOWN    Codeine UNKNOWN       Current Medications:  No current facility-administered medications for this visit.       (Not in a hospital admission)      Review of Systems:     Constitutional: Negative for chills, , fatigue, fever and unexpected weight change.    HENT: Negative for congestion, and mouth sores.    Eyes: Negative for photophobia, pain, redness and visual disturbance.    Respiratory: Negative for apnea, cough, chest tightness, shortness of breath, wheezing and stridor.    Cardiovascular: Negative for chest pain, palpitations and leg swelling.    Gastrointestinal: Negative for abdominal distention, abdominal pain, blood in stool, constipation, diarrhea and nausea.    Endocrine: Negative.     Genitourinary: Negative for decreased urine volume, difficulty urinating, dyspareunia, dysuria, flank pain, and frequency.    Musculoskeletal: Negative for arthralgias, gait problem and joint swelling.    Skin: Negative for color change, pallor and rash. No raynauds or digital ulcerations no sclerodactly.    Allergic/Immunologic: Negative.    Neurological: Negative for dizziness, tremors, seizures, syncope, speech difficulty, weakness, light-headedness, numbness and headaches.    Hematological: Does not bruise/bleed easily.    Psychiatric/Behavioral: Negative for confusion, decreased concentration, hallucinations, self-injury, sleep disturbance and suicidal ideas or depression.    Objective:   [unfilled]  /80   Pulse 78   Temp 98.2 °F (36.8 °C)   Resp 16   Ht 4' 11\"  (1.499 m)   Wt 100 lb (45.4 kg)   SpO2 95%   BMI 20.20 kg/m²       Constitutional: is oriented to person, place, and time. Appears well-developed and well-nourished. No distress.    HEENT: Normocephalic; EOMI; no jvd; no LAD; no oral or nasal ulcers.     Eyes: Conjunctivae and EOM are normal. Pupils are equal, round, and reactive to light.     Neck: Normal range of motion. No thyromegaly present.    Cardiovascular: RRR, no murmurs.    Lungs: Clear, Bilateral air entry, no wheezes.    Abdominal: Soft.    Musculoskeletal:        Right shoulder: Exhibits normal range of motion on abduction and internal rotation, no tenderness, no bony tenderness, no deformity, no laceration, no pain and no spasm.        Left shoulder: Exhibits normal range of motion on abduction and internal and external rotation.  no tenderness, no bony tenderness, no swelling, no effusion, no deformity, no pain, no spasm and normal strength.        Right elbow:  Exhibits normal range of motion, no swelling, no effusion and no deformity. No tenderness found. No medial epicondyle, no lateral epicondyle and no olecranon process tenderness noted. There are no contractures or tophi or nodules.        Left elbow:  Normal range of motion, no swelling, no effusion and no deformity. No medial epicondyle, no lateral epicondyle and no olecranon process tenderness noted. There are no contractures or tophi or nodules.        Right wrist:  Exhibits normal range of motion, no tenderness, no bony tenderness, no swelling, no effusion and no crepitus. Flexion and extension intact w/o limitation.        Left wrist: Exhibits normal range of motion, no tenderness, no bony tenderness, no swelling, no effusion, no crepitus and no deformity. Flexion and extension intact without limitation.        Right hip: Exhibits normal range of motion, normal strength, no tenderness, no bony tenderness, no swelling and no crepitus.        Right hand: No synovitis of MCP,PIP or DIP  joints; there are scattered Bouchards and Heberden nodules noted;  strength: 100%.  Moderate squaring first CMC joint        Left hand: No synovitis of MCP,PIP or DIP joints; there are scattered Bouchards and Heberden nodules noted;  strength: 100%.  Moderate squaring first CMC joint        Left hip: Exhibits normal range of motion, normal strength, no tenderness, no bony tenderness, No swelling and no crepitus.        Right knee: Exhibits normal range of motion, no swelling, no effusion, no ecchymosis, no deformity and no erythema. No tenderness found. No medial joint line, no lateral joint line, no MCL and no LCL tenderness noted. mod crepitation on flexion of knee and extension normal.        Left knee:  Exhibits normal range of motion, no swelling, no effusion, no ecchymosis and no erythema. No tenderness found. No medial joint line, no lateral joint line and no patellar tendon tenderness noted. mod crepitation on flexion of the knee. Extension intact and normal.        Right ankle: No swelling, no deformity. No tenderness. Dorsiflexion and plantar flexion intact without limitation in range of motion.        Left ankle: Exhibits no swelling. No tenderness. No lateral malleolus and no medial malleolus tenderness found. Achilles tendon normal. Achilles tendon exhibits no pain, no defect and normal Garcia's test results.  Dorsiflexion and plantar flexion intact without limitation in range of motion.        Cervical back: Exhibits normal range of motion, no tenderness, no bony tenderness, no swelling, no pain and no spasm.        Thoracic back: Exhibits normal range of motion, no tenderness, no bony tenderness and no spasm.        Lumbar back:  Exhibits normal range of motion, no tenderness, no bony tenderness, no pain and no spasm.        Right foot: normal. There is normal range of motion, no tenderness, no bony tenderness, no crepitus and no laceration. There is no synovitis or tenderness of the MTP  joints to palpation.  Bony enlargement of the first MTP joint        Left foot: normal. There is normal range of motion, no tenderness, no bony tenderness and no crepitus. There is no synovitis or tenderness of the MTP joints to palpation.  On enlargement of the first MTP joint    Lymphadenopathy: No submental, no submandibular, and no occipital adenopathy present, has no cervical adenopathy or axillary lympadenopathy.    Neurological: Alert and oriented. No focal motor or sensory abnormalities. Strength is 5/5 Upper Extremities/Lower Extremities proximally and distally.    Skin: Skin is warm, dry and intact.  No rashes no purpura petechial lesions    Psychiatric: Normal behavior.    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  Last Prolia injection was sometime January 2025.  Previous to that she was on Fosamax for 5 years through PCP.  Bone density showing persistent osteoporosis likely 2023 we will need to get the last bone density which we still have not from [facility].  Will have him sign a consent again today    Would like to obtain the last DEXA scan which was not sent yet    Last bone density also revealed osteoporosis T score -3 persistent; will have her sign a release to get the last DEXA scan on file    Fall  precautions discussed.  Continue calcium vitamin D.  At least 1200 mg calcium vitamin D at least 3 to 4000 IU D3.    She has no complaints of pain.    I suspect some venous insufficiency with known varicose veins that is causing some of her occasional lower extremity edema.  She has no swelling today    If this becomes a problem suggested she see her PCP to consider arterial and venous studies and echocardiogram    Given her order CMP magnesium vitamin D levels to be done 1 to 2 weeks before next Prolia injection    Return to clinic in 6 months    Education and counseling provided to patient. Instructed patient to call my office or seek medical attention immediately if symptoms worsen. Risks and side effects of medications and diagnosis discussed in detail and patient was given written information on new prescribed medications.    Education and counseling provided to patient.  Instructed patient to call my office or seek medical attention immediately if symptoms worsen. Risks and side effects of medications and diagnosis discussed in detail and patient was given written information on new prescribed medications.    Return to clinic:  Return in about 6 months (around 8/3/2025).    Sienna Kahn MD  7/27/2023

## 2025-03-18 ENCOUNTER — TELEPHONE (OUTPATIENT)
Dept: RHEUMATOLOGY | Facility: CLINIC | Age: 70
End: 2025-03-18

## 2025-03-28 DIAGNOSIS — M35.1 MIXED CONNECTIVE TISSUE DISEASE (HCC): ICD-10-CM

## 2025-03-28 DIAGNOSIS — M35.01 SJOGREN'S SYNDROME WITH KERATOCONJUNCTIVITIS SICCA (HCC): ICD-10-CM

## 2025-03-28 DIAGNOSIS — M04.8 OTHER AUTOINFLAMMATORY SYNDROMES (HCC): ICD-10-CM

## 2025-03-28 DIAGNOSIS — M06.09 RHEUMATOID ARTHRITIS OF MULTIPLE SITES WITH NEGATIVE RHEUMATOID FACTOR (HCC): ICD-10-CM

## 2025-03-28 RX ORDER — HYDROXYCHLOROQUINE SULFATE 200 MG/1
200 TABLET, FILM COATED ORAL DAILY
Qty: 90 TABLET | Refills: 2 | Status: SHIPPED | OUTPATIENT
Start: 2025-03-28

## 2025-03-28 NOTE — TELEPHONE ENCOUNTER
LOV: 02/03/2025    Future Appointments   Date Time Provider Department Center   7/3/2025 10:40 AM Sienna Kahn MD [facility] [address]       LF: 03/13/2025    QTY: 90    Refills:0    LABS: 11/15/2024

## 2025-06-10 ENCOUNTER — PATIENT MESSAGE (OUTPATIENT)
Dept: RHEUMATOLOGY | Facility: CLINIC | Age: 70
End: 2025-06-10

## 2025-06-10 DIAGNOSIS — M81.0 AGE-RELATED OSTEOPOROSIS WITHOUT CURRENT PATHOLOGICAL FRACTURE: Primary | ICD-10-CM

## 2025-06-10 DIAGNOSIS — M35.1 MIXED CONNECTIVE TISSUE DISEASE (HCC): ICD-10-CM

## 2025-06-10 DIAGNOSIS — M35.01 SJOGREN'S SYNDROME WITH KERATOCONJUNCTIVITIS SICCA (HCC): ICD-10-CM

## 2025-06-10 DIAGNOSIS — M04.8 OTHER AUTOINFLAMMATORY SYNDROMES (HCC): ICD-10-CM

## 2025-06-10 DIAGNOSIS — M06.09 RHEUMATOID ARTHRITIS OF MULTIPLE SITES WITH NEGATIVE RHEUMATOID FACTOR (HCC): ICD-10-CM

## 2025-06-23 RX ORDER — HYDROXYCHLOROQUINE SULFATE 200 MG/1
200 TABLET, FILM COATED ORAL DAILY
Qty: 90 TABLET | Refills: 0 | Status: SHIPPED | OUTPATIENT
Start: 2025-06-23

## 2025-06-23 NOTE — TELEPHONE ENCOUNTER
LOV: 02/03/2025    Future Appointments   Date Time Provider Department Center   7/3/2025 10:40 AM Sienna Kahn MD [facility] [address]       LF: 03/13/2025          QTY:  90          Refills: 0    EYE EXAM: 11/15/2024

## 2025-07-03 ENCOUNTER — OFFICE VISIT (OUTPATIENT)
Dept: RHEUMATOLOGY | Facility: CLINIC | Age: 70
End: 2025-07-03
Payer: MEDICARE

## 2025-07-03 ENCOUNTER — TELEPHONE (OUTPATIENT)
Dept: RHEUMATOLOGY | Facility: CLINIC | Age: 70
End: 2025-07-03

## 2025-07-03 VITALS
SYSTOLIC BLOOD PRESSURE: 138 MMHG | BODY MASS INDEX: 19.76 KG/M2 | RESPIRATION RATE: 16 BRPM | WEIGHT: 98 LBS | HEIGHT: 59 IN | TEMPERATURE: 98 F | DIASTOLIC BLOOD PRESSURE: 80 MMHG | OXYGEN SATURATION: 99 % | HEART RATE: 66 BPM

## 2025-07-03 DIAGNOSIS — Z79.899 ENCOUNTER FOR DRUG THERAPY: ICD-10-CM

## 2025-07-03 DIAGNOSIS — M06.09 RHEUMATOID ARTHRITIS OF MULTIPLE SITES WITH NEGATIVE RHEUMATOID FACTOR (HCC): ICD-10-CM

## 2025-07-03 DIAGNOSIS — E55.9 VITAMIN D DEFICIENCY, UNSPECIFIED: ICD-10-CM

## 2025-07-03 DIAGNOSIS — M35.1 MIXED CONNECTIVE TISSUE DISEASE (HCC): ICD-10-CM

## 2025-07-03 DIAGNOSIS — M70.61 TROCHANTERIC BURSITIS OF RIGHT HIP: ICD-10-CM

## 2025-07-03 DIAGNOSIS — M35.00 SJOGREN'S SYNDROME, WITH UNSPECIFIED ORGAN INVOLVEMENT (HCC): Primary | ICD-10-CM

## 2025-07-03 DIAGNOSIS — J43.2 CENTRILOBULAR EMPHYSEMA (HCC): ICD-10-CM

## 2025-07-03 DIAGNOSIS — M81.0 AGE-RELATED OSTEOPOROSIS WITHOUT CURRENT PATHOLOGICAL FRACTURE: ICD-10-CM

## 2025-07-03 PROCEDURE — 99214 OFFICE O/P EST MOD 30 MIN: CPT | Performed by: INTERNAL MEDICINE

## 2025-07-03 NOTE — PROGRESS NOTES
[facility], [address] [place] [address]      Consult     Fabiola Walker Patient Status:  No patient class for patient encounter    12/10/1955 MRN [id number]   Location [facility], [address] [place] [address] Attending No att. providers found   Hosp Day # 0 PCP EMILIANO BOOTHE     Referring Provider: PCP    Reason for Consultation: Mixed connective tissue disease; osteoarthritis    Subjective:    Fabiola Walker is a 69 year old female with  woman comes in for further evaluation for 1.5-2 month history of initial response swelling and then hand swelling with increased shoulder pain and stiffness and occasional ankle foot pain    Patient states no infections prior to the onset of symptoms    States no trauma or fevers chills. States family history of gout with the patient's mother seeing me in the past but no clear history of rheumatoid arthritis. She quit smoking about 5 years ago and has known COPD on treatment and followed by pulmonology with inhalers    She states no shortness of breath or chest pain. States no urinary or bowel symptoms.    States prior to 2 months ago she was very active and had no physical limitations. She has occasional aches and pains in her hips and knees but not too problematic. No swelling of her ankles or feet or occasional aches and pains and has had a history of plantar fasciitis in the past    She came in for initial evaluation 2023 for hand pain and swelling  She had inflammatory polyarthritis and joints  Patient had an obvious inflammatory arthritis and further testing revealed positive Kern/RNP and SSA antibody  She was started on Medrol Dosepak and x-rays were unrevealing other than mild arthritis     We had brought her into discuss the results of this testing and most likely diagnosis of early mixed connective tissue disease with more manifestations of inflammatory arthritis likely rheumatoid arthritis despite negative serological  testing or inflammatory arthritis related to mixed connective tissue disease. She has minimal sicca symptoms     She is also on hydralazine which we have discussed considering switching to another agent because of positive antihistone antibodies to avoid possibility of drug-induced lupus as well    She has been off of that as well     She had responded to Medrol Dosepak and started hydroxychloroquine 200 mg daily without any side effects. She states she did get an eye exam which we'll need to obtain as a baseline she is now down to prednisone 2.5 mg daily    She was last seen in February 2025    She continued hydroxychloroquine 200 mg daily and weaned off prednisone completely without any worsening symptoms    She did get an eye exam December 2024 negative for toxicity    Bone density also revealed osteoporosis T score -3 persistent after being on Fosamax for at least 5 years Through her PCP.  We never received the DEXA scan that was done at [facility] which we will request again today.  She did get her Prolia injection January 2025 and she is due again shortly.  She will update labs at [facility] and we will send new orders there    We will sign a release to get the last bone density.  She was unable to schedule the new bone density that was due until August 2025.  She would like to continue the Prolia injections until then    Open to changing appointment to 5 months to repeat labs and get new DEXA scan order since she will be her fourth injection of Prolia    She is trying to take her calcium vitamin D regularly.    She has no concerns at this time.    Potential risk side effects discussed. She is getting CMP and labs at [facility] 2 weeks before next injection.    She would like to remain on treatment for now she is doing very well her shoulder pain also resolved and she has no manifestations of swelling at this time    She had occasional lower extremity edema that comes and goes but not regularly.   She has nothing today that is concerning she does have known varicose veins    History/Other:        Past Medical History:  Past Medical History:    COPD (chronic obstructive pulmonary disease) (HCC)    High blood pressure        Past Surgical History:   Past Surgical History:   Procedure Laterality Date    Colostomy      Hysterectomy      Tonsillectomy         Social History:  reports that she has quit smoking. She has never used smokeless tobacco. She reports that she does not drink alcohol.    Family History:   Family History   Problem Relation Age of Onset    Hypertension Mother     Hypertension Maternal Grandmother     Hypertension Sister        Allergies:   Allergies   Allergen Reactions    Penicillins NAUSEA AND VOMITING    Caffeine UNKNOWN    Codeine UNKNOWN       Current Medications:  No current facility-administered medications for this visit.       (Not in a hospital admission)      Review of Systems:     Constitutional: Negative for chills, , fatigue, fever and unexpected weight change.    HENT: Negative for congestion, and mouth sores.    Eyes: Negative for photophobia, pain, redness and visual disturbance.    Respiratory: Negative for apnea, cough, chest tightness, shortness of breath, wheezing and stridor.    Cardiovascular: Negative for chest pain, palpitations and leg swelling.    Gastrointestinal: Negative for abdominal distention, abdominal pain, blood in stool, constipation, diarrhea and nausea.    Endocrine: Negative.     Genitourinary: Negative for decreased urine volume, difficulty urinating, dyspareunia, dysuria, flank pain, and frequency.    Musculoskeletal: Very sporadic arthralgias, no  gait problem and joint swelling.    Skin: Negative for color change, pallor and rash. No raynauds or digital ulcerations no sclerodactly.    Allergic/Immunologic: Negative.    Neurological: Negative for dizziness, tremors, seizures, syncope, speech difficulty, weakness, light-headedness, numbness and  headaches.    Hematological: Does not bruise/bleed easily.    Psychiatric/Behavioral: Negative for confusion, decreased concentration, hallucinations, self-injury, +sleep disturbance and no suicidal ideas or depression.    Objective:   [unfilled]  /80   Pulse 66   Temp 98 °F (36.7 °C)   Resp 16   Ht 4' 11\" (1.499 m)   Wt 98 lb (44.5 kg)   SpO2 99%   BMI 19.79 kg/m²       Constitutional: is oriented to person, place, and time. Appears well-developed and well-nourished. No distress.    HEENT: Normocephalic; EOMI; no jvd; no LAD; no oral or nasal ulcers.     Eyes: Conjunctivae and EOM are normal. Pupils are equal, round, and reactive to light.     Neck: Normal range of motion. No thyromegaly present.    Cardiovascular: RRR, no murmurs.    Lungs: Clear, Bilateral air entry, no wheezes.    Abdominal: Soft.    Musculoskeletal:        Right shoulder: Exhibits normal range of motion on abduction and internal rotation, no tenderness, no bony tenderness, no deformity, no laceration, no pain and no spasm.        Left shoulder: Exhibits normal range of motion on abduction and internal and external rotation.  no tenderness, no bony tenderness, no swelling, no effusion, no deformity, no pain, no spasm and normal strength.        Right elbow:  Exhibits normal range of motion, no swelling, no effusion and no deformity. No tenderness found. No medial epicondyle, no lateral epicondyle and no olecranon process tenderness noted. There are no contractures or tophi or nodules.        Left elbow:  Normal range of motion, no swelling, no effusion and no deformity. No medial epicondyle, no lateral epicondyle and no olecranon process tenderness noted. There are no contractures or tophi or nodules.        Right wrist:  Exhibits normal range of motion, no tenderness, no bony tenderness, no swelling, no effusion and no crepitus. Flexion and extension intact w/o limitation.        Left wrist: Exhibits normal range of motion,  no tenderness, no bony tenderness, no swelling, no effusion, no crepitus and no deformity. Flexion and extension intact without limitation.        Right hip: Exhibits normal range of motion, normal strength, no tenderness, no bony tenderness, no swelling and no crepitus.        Right hand: No synovitis of MCP,PIP or DIP joints; there are scattered Bouchards and Heberden nodules noted;  strength: 100%.  Moderate squaring first CMC joint        Left hand: No synovitis of MCP,PIP or DIP joints; there are scattered Bouchards and Heberden nodules noted;  strength: 100%.  Moderate squaring first CMC joint        Left hip: Exhibits normal range of motion, normal strength, no tenderness, no bony tenderness, No swelling and no crepitus.        Right knee: Exhibits normal range of motion, no swelling, no effusion, no ecchymosis, no deformity and no erythema. No tenderness found. No medial joint line, no lateral joint line, no MCL and no LCL tenderness noted. mod crepitation on flexion of knee and extension normal.        Left knee:  Exhibits normal range of motion, no swelling, no effusion, no ecchymosis and no erythema. No tenderness found. No medial joint line, no lateral joint line and no patellar tendon tenderness noted. mod crepitation on flexion of the knee. Extension intact and normal.        Right ankle: No swelling, no deformity. No tenderness. Dorsiflexion and plantar flexion intact without limitation in range of motion.        Left ankle: Exhibits no swelling. No tenderness. No lateral malleolus and no medial malleolus tenderness found. Achilles tendon normal. Achilles tendon exhibits no pain, no defect and normal Garcia's test results.  Dorsiflexion and plantar flexion intact without limitation in range of motion.        Cervical back: Exhibits normal range of motion, no tenderness, no bony tenderness, no swelling, no pain and no spasm.        Thoracic back: Exhibits normal range of motion, no  tenderness, no bony tenderness and no spasm.        Lumbar back:  Exhibits normal range of motion, no tenderness, no bony tenderness, no pain and no spasm.        Right foot: normal. There is normal range of motion, no tenderness, no bony tenderness, no crepitus and no laceration. There is no synovitis or tenderness of the MTP joints to palpation.  Bony enlargement of the first MTP joint        Left foot: normal. There is normal range of motion, no tenderness, no bony tenderness and no crepitus. There is no synovitis or tenderness of the MTP joints to palpation.  On enlargement of the first MTP joint    Lymphadenopathy: No submental, no submandibular, and no occipital adenopathy present, has no cervical adenopathy or axillary lympadenopathy.    Neurological: Alert and oriented. No focal motor or sensory abnormalities. Strength is 5/5 Upper Extremities/Lower Extremities proximally and distally.    Skin: Skin is warm, dry and intact.  No rashes no purpura petechial lesions    Psychiatric: Normal behavior.    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  Last Prolia injection was sometime January 2025.  Previous to that she was on Fosamax for 5 years through PCP.  Bone density showing persistent osteoporosis likely 2023 we will need to get  the last bone density which we still have not from [facility].  Will have him sign a consent again today.  [facility]    Bone density per patient and daughter today could only be scheduled August 2025    Would like to obtain the last DEXA scan which was not sent yet    Last bone density also revealed osteoporosis T score -3 persistent; will have her sign a release to get the last DEXA scan on file    Fall precautions discussed.  Continue calcium vitamin D.  At least 1200 mg calcium vitamin D at least 3 to 4000 IU D3.    She has no complaints of pain.    I suspect some venous insufficiency with known varicose veins that is causing some of her occasional lower extremity edema.  She has no swelling today    If this becomes a problem suggested she see her PCP to consider arterial and venous studies and echocardiogram    Given her order CMP magnesium vitamin D levels to be done 1 to 2 weeks before next Prolia injection    Return to clinic in 6 months    Education and counseling provided to patient. Instructed patient to call my office or seek medical attention immediately if symptoms worsen. Risks and side effects of medications and diagnosis discussed in detail and patient was given written information on new prescribed medications.    Education and counseling provided to patient.  Instructed patient to call my office or seek medical attention immediately if symptoms worsen. Risks and side effects of medications and diagnosis discussed in detail and patient was given written information on new prescribed medications.    Return to clinic:  Return in about 6 months (around 1/3/2026).    Sienna Kahn MD  7/27/2023

## 2025-07-03 NOTE — TELEPHONE ENCOUNTER
Please confirm last Prolia injection she should be due shortly.  Given labs today to take to [facility] and send orders accordingly

## 2025-07-07 NOTE — TELEPHONE ENCOUNTER
Left voice message for patient to call office. Inquiring if labs have been completed at [facility].Sent a detailed MyChart message.

## 2025-07-16 ENCOUNTER — TELEPHONE (OUTPATIENT)
Facility: CLINIC | Age: 70
End: 2025-07-16

## 2025-07-16 NOTE — TELEPHONE ENCOUNTER
Received order for Dr. Kahn to sign for Prolia.    Signed and faxed to [facility] on 7/16/25.    Bar code printed and sent to scanning along with fax confirmation.

## 2025-07-16 NOTE — TELEPHONE ENCOUNTER
Order for Prolia at [facility] faxed to [facility] for approval. Labs received.

## (undated) NOTE — LETTER
January 29, 2024         Fran Naylor MD  [address] [id number]      Patient: Fabiola Walker   YOB: 1955   Date of Visit: 1/29/2024       Dear Dr. Naylor,    I saw your patient, Fabiola Walker, on 1/29/2024. Enclosed is my consultation / progress note from that encounter. Thank you for allowing me to participate in the care of this patient.    Sincerely,                           Sienna Kahn MD  [address], [address] [place] [phone]    Document electronically generated by:  Sienna Kahn MD on 1/29/2024    CC: No Recipients    Enclosure